# Patient Record
Sex: MALE | Race: WHITE | NOT HISPANIC OR LATINO | URBAN - METROPOLITAN AREA
[De-identification: names, ages, dates, MRNs, and addresses within clinical notes are randomized per-mention and may not be internally consistent; named-entity substitution may affect disease eponyms.]

---

## 2023-05-15 LAB
ALANINE AMINOTRANSFERASE (SGPT) (U/L) IN SER/PLAS: 19 U/L (ref 10–52)
ALBUMIN (G/DL) IN SER/PLAS: 3.8 G/DL (ref 3.4–5)
ALKALINE PHOSPHATASE (U/L) IN SER/PLAS: 72 U/L (ref 33–136)
ANION GAP IN SER/PLAS: 13 MMOL/L (ref 10–20)
ASPARTATE AMINOTRANSFERASE (SGOT) (U/L) IN SER/PLAS: 16 U/L (ref 9–39)
BASOPHILS (10*3/UL) IN BLOOD BY AUTOMATED COUNT: 0.05 X10E9/L (ref 0–0.1)
BASOPHILS/100 LEUKOCYTES IN BLOOD BY AUTOMATED COUNT: 0.7 % (ref 0–2)
BILIRUBIN TOTAL (MG/DL) IN SER/PLAS: 0.7 MG/DL (ref 0–1.2)
CALCIDIOL (25 OH VITAMIN D3) (NG/ML) IN SER/PLAS: 40 NG/ML
CALCIUM (MG/DL) IN SER/PLAS: 8.8 MG/DL (ref 8.6–10.3)
CARBON DIOXIDE, TOTAL (MMOL/L) IN SER/PLAS: 29 MMOL/L (ref 21–32)
CHLORIDE (MMOL/L) IN SER/PLAS: 103 MMOL/L (ref 98–107)
CHOLESTEROL (MG/DL) IN SER/PLAS: 165 MG/DL (ref 0–199)
CHOLESTEROL IN HDL (MG/DL) IN SER/PLAS: 36.1 MG/DL
CHOLESTEROL/HDL RATIO: 4.6
CREATININE (MG/DL) IN SER/PLAS: 0.69 MG/DL (ref 0.5–1.3)
EOSINOPHILS (10*3/UL) IN BLOOD BY AUTOMATED COUNT: 0.27 X10E9/L (ref 0–0.7)
EOSINOPHILS/100 LEUKOCYTES IN BLOOD BY AUTOMATED COUNT: 3.9 % (ref 0–6)
ERYTHROCYTE DISTRIBUTION WIDTH (RATIO) BY AUTOMATED COUNT: 13.2 % (ref 11.5–14.5)
ERYTHROCYTE MEAN CORPUSCULAR HEMOGLOBIN CONCENTRATION (G/DL) BY AUTOMATED: 33 G/DL (ref 32–36)
ERYTHROCYTE MEAN CORPUSCULAR VOLUME (FL) BY AUTOMATED COUNT: 84 FL (ref 80–100)
ERYTHROCYTES (10*6/UL) IN BLOOD BY AUTOMATED COUNT: 5.18 X10E12/L (ref 4.5–5.9)
ESTIMATED AVERAGE GLUCOSE FOR HBA1C: 103 MG/DL
GFR MALE: >90 ML/MIN/1.73M2
GLUCOSE (MG/DL) IN SER/PLAS: 99 MG/DL (ref 74–99)
HEMATOCRIT (%) IN BLOOD BY AUTOMATED COUNT: 43.3 % (ref 41–52)
HEMOGLOBIN (G/DL) IN BLOOD: 14.3 G/DL (ref 13.5–17.5)
HEMOGLOBIN A1C/HEMOGLOBIN TOTAL IN BLOOD: 5.2 %
IMMATURE GRANULOCYTES/100 LEUKOCYTES IN BLOOD BY AUTOMATED COUNT: 0.6 % (ref 0–0.9)
LDL: 99 MG/DL (ref 0–99)
LEUKOCYTES (10*3/UL) IN BLOOD BY AUTOMATED COUNT: 7 X10E9/L (ref 4.4–11.3)
LYMPHOCYTES (10*3/UL) IN BLOOD BY AUTOMATED COUNT: 1.93 X10E9/L (ref 1.2–4.8)
LYMPHOCYTES/100 LEUKOCYTES IN BLOOD BY AUTOMATED COUNT: 27.7 % (ref 13–44)
MONOCYTES (10*3/UL) IN BLOOD BY AUTOMATED COUNT: 0.53 X10E9/L (ref 0.1–1)
MONOCYTES/100 LEUKOCYTES IN BLOOD BY AUTOMATED COUNT: 7.6 % (ref 2–10)
NEUTROPHILS (10*3/UL) IN BLOOD BY AUTOMATED COUNT: 4.15 X10E9/L (ref 1.2–7.7)
NEUTROPHILS/100 LEUKOCYTES IN BLOOD BY AUTOMATED COUNT: 59.5 % (ref 40–80)
PLATELETS (10*3/UL) IN BLOOD AUTOMATED COUNT: 286 X10E9/L (ref 150–450)
POTASSIUM (MMOL/L) IN SER/PLAS: 3.6 MMOL/L (ref 3.5–5.3)
PROTEIN TOTAL: 6.3 G/DL (ref 6.4–8.2)
SODIUM (MMOL/L) IN SER/PLAS: 141 MMOL/L (ref 136–145)
THYROTROPIN (MIU/L) IN SER/PLAS BY DETECTION LIMIT <= 0.05 MIU/L: 2.41 MIU/L (ref 0.44–3.98)
TRIGLYCERIDE (MG/DL) IN SER/PLAS: 151 MG/DL (ref 0–149)
UREA NITROGEN (MG/DL) IN SER/PLAS: 26 MG/DL (ref 6–23)
VLDL: 30 MG/DL (ref 0–40)

## 2023-05-19 LAB
PROSTATE SPECIFIC AG (NG/ML) IN SER/PLAS: 5.9 NG/ML (ref 0–4)
PROSTATE SPECIFIC AG FREE (NG/ML) IN SER/PLAS: 0.8 NG/ML
PROSTATE SPECIFIC AG FREE/PROSTATE SPECIFIC AG TOTAL IN SER/PLAS: 14 %

## 2023-06-14 LAB — PROSTATE SPECIFIC AG (NG/ML) IN SER/PLAS: 4.35 NG/ML (ref 0–4)

## 2024-01-08 ENCOUNTER — LAB (OUTPATIENT)
Dept: LAB | Facility: LAB | Age: 64
End: 2024-01-08
Payer: MEDICARE

## 2024-01-08 DIAGNOSIS — I10 ESSENTIAL (PRIMARY) HYPERTENSION: Primary | ICD-10-CM

## 2024-01-08 LAB
ANION GAP SERPL CALC-SCNC: 12 MMOL/L (ref 10–20)
BUN SERPL-MCNC: 21 MG/DL (ref 6–23)
CALCIUM SERPL-MCNC: 8.8 MG/DL (ref 8.6–10.3)
CHLORIDE SERPL-SCNC: 102 MMOL/L (ref 98–107)
CO2 SERPL-SCNC: 29 MMOL/L (ref 21–32)
CREAT SERPL-MCNC: 0.73 MG/DL (ref 0.5–1.3)
EGFRCR SERPLBLD CKD-EPI 2021: >90 ML/MIN/1.73M*2
GLUCOSE SERPL-MCNC: 108 MG/DL (ref 74–99)
POTASSIUM SERPL-SCNC: 3.3 MMOL/L (ref 3.5–5.3)
SODIUM SERPL-SCNC: 140 MMOL/L (ref 136–145)

## 2024-01-08 PROCEDURE — 80048 BASIC METABOLIC PNL TOTAL CA: CPT

## 2024-01-08 PROCEDURE — 36415 COLL VENOUS BLD VENIPUNCTURE: CPT

## 2024-07-30 ENCOUNTER — APPOINTMENT (OUTPATIENT)
Dept: UROLOGY | Facility: CLINIC | Age: 64
End: 2024-07-30
Payer: MEDICARE

## 2024-07-30 VITALS — WEIGHT: 295 LBS | HEIGHT: 72 IN | TEMPERATURE: 97.1 F | BODY MASS INDEX: 39.96 KG/M2

## 2024-07-30 DIAGNOSIS — N13.8 BPH WITH OBSTRUCTION/LOWER URINARY TRACT SYMPTOMS: ICD-10-CM

## 2024-07-30 DIAGNOSIS — N40.1 BPH WITH OBSTRUCTION/LOWER URINARY TRACT SYMPTOMS: ICD-10-CM

## 2024-07-30 DIAGNOSIS — R97.20 ELEVATED PSA: Primary | ICD-10-CM

## 2024-07-30 LAB
POC APPEARANCE, URINE: CLEAR
POC BILIRUBIN, URINE: NEGATIVE
POC BLOOD, URINE: NEGATIVE
POC COLOR, URINE: YELLOW
POC GLUCOSE, URINE: NEGATIVE MG/DL
POC KETONES, URINE: NEGATIVE MG/DL
POC LEUKOCYTES, URINE: NEGATIVE
POC NITRITE,URINE: NEGATIVE
POC PH, URINE: 7 PH
POC PROTEIN, URINE: NEGATIVE MG/DL
POC SPECIFIC GRAVITY, URINE: 1.01
POC UROBILINOGEN, URINE: 0.2 EU/DL

## 2024-07-30 PROCEDURE — 99203 OFFICE O/P NEW LOW 30 MIN: CPT | Performed by: UROLOGY

## 2024-07-30 PROCEDURE — 51798 US URINE CAPACITY MEASURE: CPT | Performed by: UROLOGY

## 2024-07-30 PROCEDURE — 3008F BODY MASS INDEX DOCD: CPT | Performed by: UROLOGY

## 2024-07-30 PROCEDURE — 1036F TOBACCO NON-USER: CPT | Performed by: UROLOGY

## 2024-07-30 PROCEDURE — G2211 COMPLEX E/M VISIT ADD ON: HCPCS | Performed by: UROLOGY

## 2024-07-30 PROCEDURE — 81002 URINALYSIS NONAUTO W/O SCOPE: CPT | Performed by: UROLOGY

## 2024-07-30 RX ORDER — SILDENAFIL 50 MG/1
50 TABLET, FILM COATED ORAL DAILY PRN
COMMUNITY

## 2024-07-30 RX ORDER — AMLODIPINE BESYLATE 10 MG/1
10 TABLET ORAL
COMMUNITY
Start: 2024-05-28

## 2024-07-30 RX ORDER — LISINOPRIL 40 MG/1
40 TABLET ORAL
COMMUNITY
Start: 2024-04-15

## 2024-07-30 RX ORDER — HYDROCHLOROTHIAZIDE 50 MG/1
50 TABLET ORAL
COMMUNITY
Start: 2024-06-19

## 2024-07-30 ASSESSMENT — PAIN SCALES - GENERAL: PAINLEVEL: 0-NO PAIN

## 2024-07-30 NOTE — PROGRESS NOTES
Subjective   Tay Pereira is a 64 y.o. male with history of BPH with LUTS s/p HoLEP in 2017, patient presents today due to elevated PSA. His PSA was 4.35 on 6/13/2024. Patient has occasional urinary hesitancy and nocturia x3 which are not bothersome. Denies any recent gross hematuria, fevers, chills, urinary retention, intractable flank or abdominal pain, nausea or vomiting.            Past Medical History:   Diagnosis Date    Benign prostatic hyperplasia with lower urinary tract symptoms 10/02/2017    BPH with obstruction/lower urinary tract symptoms    Benign prostatic hyperplasia with lower urinary tract symptoms 01/08/2018    BPH associated with nocturia    Cellulitis of right lower limb 04/18/2016    Cellulitis of right lower extremity    Cellulitis of unspecified part of limb 04/18/2016    Cellulitis of ankle    Localized edema 04/18/2016    Localized edema    Personal history of other diseases of the respiratory system 04/05/2015    History of viral pharyngitis    Urge incontinence 01/08/2018    Urge incontinence     Past Surgical History:   Procedure Laterality Date    ANTERIOR CRUCIATE LIGAMENT REPAIR  05/10/2013    Primary Repair Of Knee Ligament Cruciate Anterior    OTHER SURGICAL HISTORY  08/05/2016    Treatment Of Ankle Fracture    TOTAL HIP ARTHROPLASTY  08/06/2014    Hip Replacement    TOTAL KNEE ARTHROPLASTY  08/09/2017    Total Knee Replacement Right     No family history on file.  Current Outpatient Medications   Medication Sig Dispense Refill    amLODIPine (Norvasc) 10 mg tablet Take 1 tablet (10 mg) by mouth once daily.      hydroCHLOROthiazide (HYDRODiuril) 50 mg tablet Take 1 tablet (50 mg) by mouth once daily.      lisinopril 40 mg tablet Take 1 tablet (40 mg) by mouth once daily.      sildenafil (Viagra) 50 mg tablet Take 1 tablet (50 mg) by mouth once daily as needed.       No current facility-administered medications for this visit.     No Known Allergies  Social History     Socioeconomic  History    Marital status:      Spouse name: Not on file    Number of children: Not on file    Years of education: Not on file    Highest education level: Not on file   Occupational History    Not on file   Tobacco Use    Smoking status: Never    Smokeless tobacco: Never   Substance and Sexual Activity    Alcohol use: Not on file    Drug use: Not on file    Sexual activity: Not on file   Other Topics Concern    Not on file   Social History Narrative    Not on file     Social Determinants of Health     Financial Resource Strain: Not on file   Food Insecurity: Not on file   Transportation Needs: Not on file   Physical Activity: Not on file   Stress: Not on file   Social Connections: Not on file   Intimate Partner Violence: Not on file   Housing Stability: Not on file       Review of Systems  Pertinent items are noted in HPI.    Objective       Lab Review  Lab Results   Component Value Date    WBC 7.0 05/15/2023    RBC 5.18 05/15/2023    HGB 14.3 05/15/2023    HCT 43.3 05/15/2023     05/15/2023      Lab Results   Component Value Date    BUN 21 01/08/2024    CREATININE 0.73 01/08/2024      Lab Results   Component Value Date    PSA 4.35 (H) 06/13/2023    PSA 5.9 (H) 05/15/2023     Urine analysis shows negative     Assessment/Plan   Diagnoses and all orders for this visit:  Elevated PSA  -     POCT UA (nonautomated) manually resulted  -     Measure post void residual  -     MR prostate with pan boundaries if pirads 3 or above; Future  BPH with obstruction/lower urinary tract symptoms    Elevated PSA     We will obtain a prostate MRI and follow up virtually to review.     BPH with LUTS s/p PVP in 2017.       Patient has no bothersome urinary symptoms, we will continue to monitor.     All questions were answered to the patient's satisfaction. Patient agrees with the plan and wishes to proceed. Follow-up will be scheduled appropriately.     E&M visit today is associated with current or anticipated ongoing  medical care services related to a patient's single, serious condition or a complex condition.      Scribed for Dr. Arzola by Gisele Thomas. I , Dr Arzola, have personally reviewed and agreed with the information entered by the Virtual Scribe.

## 2024-08-05 ENCOUNTER — OFFICE VISIT (OUTPATIENT)
Dept: NEUROLOGY | Facility: CLINIC | Age: 64
End: 2024-08-05
Payer: MEDICARE

## 2024-08-05 VITALS
HEART RATE: 66 BPM | HEIGHT: 73 IN | WEIGHT: 295 LBS | BODY MASS INDEX: 39.1 KG/M2 | SYSTOLIC BLOOD PRESSURE: 139 MMHG | DIASTOLIC BLOOD PRESSURE: 91 MMHG | RESPIRATION RATE: 18 BRPM

## 2024-08-05 DIAGNOSIS — G60.0 CMT (CHARCOT-MARIE-TOOTH DISEASE): Primary | ICD-10-CM

## 2024-08-05 DIAGNOSIS — G60.0 CHARCOT-MARIE-TOOTH DISEASE TYPE 1A: ICD-10-CM

## 2024-08-05 PROCEDURE — 3008F BODY MASS INDEX DOCD: CPT | Performed by: PSYCHIATRY & NEUROLOGY

## 2024-08-05 PROCEDURE — 1036F TOBACCO NON-USER: CPT | Performed by: PSYCHIATRY & NEUROLOGY

## 2024-08-05 PROCEDURE — 99214 OFFICE O/P EST MOD 30 MIN: CPT | Performed by: PSYCHIATRY & NEUROLOGY

## 2024-08-05 ASSESSMENT — PATIENT HEALTH QUESTIONNAIRE - PHQ9
SUM OF ALL RESPONSES TO PHQ9 QUESTIONS 1 AND 2: 0
1. LITTLE INTEREST OR PLEASURE IN DOING THINGS: NOT AT ALL
2. FEELING DOWN, DEPRESSED OR HOPELESS: NOT AT ALL

## 2024-08-05 ASSESSMENT — ENCOUNTER SYMPTOMS
OCCASIONAL FEELINGS OF UNSTEADINESS: 1
DEPRESSION: 0
LOSS OF SENSATION IN FEET: 1

## 2024-08-05 ASSESSMENT — PAIN SCALES - GENERAL: PAINLEVEL: 0-NO PAIN

## 2024-08-05 NOTE — PROGRESS NOTES
Neuromuscular Medicine Follow Up     Tay Pereira, MRN: [id number], : 1960  Reason for Visit: Charcot-Hiral-Tooth Disease (Follow up)     Primary Care Physician: Demetris Steen MD           Diagnosis:     Autosomal dominant Charcot-Hiral-Tooth disease type 1A      Impression/Plan:   Impression:  Tay Pereira is a 64 y.o.  male who presents for follow-up of severe CMT1A. He has been slowly progressive over the years with difficulty walking and fine manipulation involving his hands. He does not report any major medical or neurological updates since last being seen. No changes from a neurology perspective and will continue to hope for new discoveries or treatments for CMT1A    Plan:  -No change in management at this time  -Car disability placard order printed and given to patient today  -Continue daily stretching as needed and wear your left AFO when ambulating.   -Hand splints are still recommended to prevent finger contractures.    Follow-up in 1 year    Gasper Stone MD  Neuromuscular Fellow     ATTENDING NOTE - REYMUNDO FORD M.D.    I saw patient with trainee and agree with the edits, history and exam that I helped formulate per above.      Reymundo Ford M.D., F.A.C.P.   Director, Neuromuscular Center & EMG laboratory   [facility]   [facility]   Professor of Neurology   [facility] [employer]        History of Present Illness:    Mr. Pereira is a 64 y.o.  male who presents for follow-up of severe CMT1A    Interval history:  Patient notes there have been no major medical updates since last being seen 1 year ago. He does have an MRI of his prostate in about 1 month and he reports a history of BPH. Otherwise he has been staying healthy without any medical concerns.    Denies any major changes in his neurologic symptoms. He retired recently and is on disability. Is walking with a walking stick okay. Fatigue is an issue  "throughout the day. Has trouble buttoning his shirt but is able to use normal utensils and tie his shoes.    Has no other concerns today.    Relevant past medical, surgical, family, and social histories, along with ROS was reviewed and pertinent details noted above.     No Known Allergies   Medications:    Current Outpatient Medications:     amLODIPine (Norvasc) 10 mg tablet, Take 1 tablet (10 mg) by mouth once daily., Disp: , Rfl:     hydroCHLOROthiazide (HYDRODiuril) 50 mg tablet, Take 1 tablet (50 mg) by mouth once daily., Disp: , Rfl:     lisinopril 40 mg tablet, Take 1 tablet (40 mg) by mouth once daily., Disp: , Rfl:     sildenafil (Viagra) 50 mg tablet, Take 1 tablet (50 mg) by mouth once daily as needed., Disp: , Rfl:        Physical Exam:   BP (!) 139/91   Pulse 66   Resp 18   Ht 1.854 m (6' 1\")   Wt 134 kg (295 lb)   BMI 38.92 kg/m²      General Appearance:  No distress, alert, interactive and cooperative   Cranial Nerves:  CN 3, 4, 6   Lids symmetric; no ptosis  EOMs fully intact  No nystagmus   CN 7   Normal and symmetric facial strength; nasolabial folds symmetric   CN 8   Hearing intact to conversation    CN 9, 10   Palate elevates symmetrically   Phonation within normal limits, no dysarthria  CN 11   Shoulder shrug 5/5 bilaterally  CN 12   Tongue midline, with normal bulk and strength; no fasciculations     Motor:   There is atrophy at both interossei, thenar, and hypothenar eminences with clawing of bilateral hands L>R (mostly in digits 4 and 5)    Manual Muscle Testing (MMT) reveals the following MRC grades:  R L   Shoulder abduction  5- 5-  Elbow flexion   5 5  Elbow extension  5 5  Wrist flexion   4+ 4+   Wrist extension  5- 5-  Hip flexion   4 4  Knee flexion   5 5  Knee extension  5 5  Ankle dorsiflexion  X 0  Ankle plantarflexion  X 0    X=R ankle fused    Reflexes:     R          L  BR:               2          2  Biceps:         2          2  Triceps:        2          2  Knee:           " 2          2  Ankle:          2          2    Babinski: Toes are down going  Guevara's: Not present  No clonus or other pathological reflexes  No jaw jerk reflex    Sensory:   Deferred today    Gait:   Wide based gait with steppage quality and AFO on the left    Results:     No recent results to review

## 2024-08-14 ENCOUNTER — HOSPITAL ENCOUNTER (OUTPATIENT)
Dept: RADIOLOGY | Facility: HOSPITAL | Age: 64
Discharge: HOME | End: 2024-08-14
Payer: MEDICARE

## 2024-08-14 DIAGNOSIS — R97.20 ELEVATED PSA: ICD-10-CM

## 2024-08-14 PROCEDURE — 72197 MRI PELVIS W/O & W/DYE: CPT

## 2024-08-14 PROCEDURE — A9575 INJ GADOTERATE MEGLUMI 0.1ML: HCPCS | Performed by: UROLOGY

## 2024-08-14 PROCEDURE — 72197 MRI PELVIS W/O & W/DYE: CPT | Performed by: RADIOLOGY

## 2024-08-14 PROCEDURE — 2550000001 HC RX 255 CONTRASTS: Performed by: UROLOGY

## 2024-08-14 RX ORDER — GADOTERATE MEGLUMINE 376.9 MG/ML
20 INJECTION INTRAVENOUS
Status: COMPLETED | OUTPATIENT
Start: 2024-08-14 | End: 2024-08-14

## 2024-08-14 RX ORDER — GADOTERATE MEGLUMINE 376.9 MG/ML
7 INJECTION INTRAVENOUS
Status: COMPLETED | OUTPATIENT
Start: 2024-08-14 | End: 2024-08-14

## 2024-09-03 NOTE — PROGRESS NOTES
Subjective     This visit was completed via telemedicine. All issues as below were discussed and addressed but no physical exam was performed unless allowed by visual confirmation. If it was felt that the patient should be evaluated in clinic, then they were directed there. Patient verbally consented to visit.      Tay Pereira is a 64 y.o. male with history of BPH with LUTS s/p PVP in 2017 and elevated PSA, presenting today to review prostate MRI. Patient has no new complaints.  Denies any recent gross hematuria, fevers, chills, urinary retention, intractable flank or abdominal pain, nausea or vomiting.            Past Medical History:   Diagnosis Date    Benign prostatic hyperplasia with lower urinary tract symptoms 10/02/2017    BPH with obstruction/lower urinary tract symptoms    Benign prostatic hyperplasia with lower urinary tract symptoms 01/08/2018    BPH associated with nocturia    Cellulitis of right lower limb 04/18/2016    Cellulitis of right lower extremity    Cellulitis of unspecified part of limb 04/18/2016    Cellulitis of ankle    Localized edema 04/18/2016    Localized edema    Personal history of other diseases of the respiratory system 04/05/2015    History of viral pharyngitis    Urge incontinence 01/08/2018    Urge incontinence     Past Surgical History:   Procedure Laterality Date    ANTERIOR CRUCIATE LIGAMENT REPAIR  05/10/2013    Primary Repair Of Knee Ligament Cruciate Anterior    OTHER SURGICAL HISTORY  08/05/2016    Treatment Of Ankle Fracture    TOTAL HIP ARTHROPLASTY  08/06/2014    Hip Replacement    TOTAL KNEE ARTHROPLASTY  08/09/2017    Total Knee Replacement Right     Family History   Problem Relation Name Age of Onset    Hypertension Mother      Charcot-Hiral-Tooth disease Father      Heart failure Father       Current Outpatient Medications   Medication Sig Dispense Refill    amLODIPine (Norvasc) 10 mg tablet Take 1 tablet (10 mg) by mouth once daily.      hydroCHLOROthiazide  (HYDRODiuril) 50 mg tablet Take 1 tablet (50 mg) by mouth once daily.      lisinopril 40 mg tablet Take 1 tablet (40 mg) by mouth once daily.      sildenafil (Viagra) 50 mg tablet Take 1 tablet (50 mg) by mouth once daily as needed.       No current facility-administered medications for this visit.     No Known Allergies  Social History     Socioeconomic History    Marital status:      Spouse name: Not on file    Number of children: Not on file    Years of education: Not on file    Highest education level: Not on file   Occupational History    Not on file   Tobacco Use    Smoking status: Never    Smokeless tobacco: Never   Substance and Sexual Activity    Alcohol use: Not on file    Drug use: Not on file    Sexual activity: Not on file   Other Topics Concern    Not on file   Social History Narrative    Not on file     Social Determinants of Health     Financial Resource Strain: Not on file   Food Insecurity: Not on file   Transportation Needs: Not on file   Physical Activity: Not on file   Stress: Not on file   Social Connections: Not on file   Intimate Partner Violence: Not on file   Housing Stability: Not on file       Review of Systems  Pertinent items are noted in HPI.    Objective       Lab Review  Lab Results   Component Value Date    WBC 7.0 05/15/2023    RBC 5.18 05/15/2023    HGB 14.3 05/15/2023    HCT 43.3 05/15/2023     05/15/2023      Lab Results   Component Value Date    BUN 21 01/08/2024    CREATININE 0.73 01/08/2024      Lab Results   Component Value Date    PSA 4.35 (H) 06/13/2023    PSA 5.9 (H) 05/15/2023           Assessment/Plan   There are no diagnoses linked to this encounter.    History of BPH with LUTS s/p PVP in 2017  Elevated PSA    I reviewed prostate MRI from 8/14/2024 which showed 86.39g prostate with no evidence of cancer.     We will recheck PSA in 6 months and follow up virtually to review.       All questions were answered to the patient's satisfaction. Patient agrees  with the plan and wishes to proceed. Follow-up will be scheduled appropriately.     E&M visit today is associated with current or anticipated ongoing medical care services related to a patient's single, serious condition or a complex condition.    Scribed for Dr. Arzola by Gisele Thomas. I , Dr Arzola, have personally reviewed and agreed with the information entered by the Virtual Scribe.

## 2024-09-04 ENCOUNTER — APPOINTMENT (OUTPATIENT)
Dept: UROLOGY | Facility: CLINIC | Age: 64
End: 2024-09-04
Payer: MEDICARE

## 2024-09-04 DIAGNOSIS — N13.8 BPH WITH OBSTRUCTION/LOWER URINARY TRACT SYMPTOMS: ICD-10-CM

## 2024-09-04 DIAGNOSIS — R97.20 ELEVATED PSA: Primary | ICD-10-CM

## 2024-09-04 DIAGNOSIS — N40.1 BPH WITH OBSTRUCTION/LOWER URINARY TRACT SYMPTOMS: ICD-10-CM

## 2024-09-04 PROCEDURE — G2211 COMPLEX E/M VISIT ADD ON: HCPCS | Performed by: UROLOGY

## 2024-09-04 PROCEDURE — 99213 OFFICE O/P EST LOW 20 MIN: CPT | Performed by: UROLOGY

## 2025-03-11 ENCOUNTER — APPOINTMENT (OUTPATIENT)
Dept: UROLOGY | Facility: CLINIC | Age: 65
End: 2025-03-11
Payer: MEDICARE

## 2025-08-04 ENCOUNTER — APPOINTMENT (OUTPATIENT)
Dept: NEUROLOGY | Facility: CLINIC | Age: 65
End: 2025-08-04
Payer: MEDICARE

## 2025-08-04 DIAGNOSIS — G60.0 CHARCOT-MARIE-TOOTH DISEASE TYPE 1A: Primary | ICD-10-CM

## 2025-08-04 PROCEDURE — 99214 OFFICE O/P EST MOD 30 MIN: CPT | Performed by: PSYCHIATRY & NEUROLOGY

## 2025-08-04 NOTE — PROGRESS NOTES
Neuromuscular Medicine Follow Up     Tay Pereira, MRN: [id number], : 1960  Reason for Visit: Charcot-Hiral-Tooth Disease (Follow up)   Primary Care Physician: Demetris Steen MD     Diagnosis:     Charcot-Hiral-Tooth disease type 1A (CMT1A)    Impression/Plan:   Tay Pereira is a 65-year-old man presents today for follow-up visit for severe CMT1A. He has been slowly progressive over the years with difficulty walking and fine manipulation involving his hands. He does not report any major medical or neurological updates since last being seen. No changes from a neurology perspective and will continue to hope for new discoveries or treatments for CMT1A     Plan:  - No change in management at this time.  - Continue daily stretching as needed and wear your left AFO when ambulating.   - Follow-up in 1 year.     Carter Quintanilla MD  Neuromuscular Fellow    ATTENDING NOTE - REYMUNDO FORD M.D.    I saw patient with trainee and agree with the edits, history and exam that I helped formulate per above.      Reymundo Ford M.D., F.A.C.P.   Director, Neuromuscular Center & EMG laboratory   [facility]   [facility]   Professor of Neurology   [facility] [employer]    The total appointment time today was 30 minutes. Time included preparing to see the patient, obtaining the history, performing a medically necessary appropriate physical examination, counseling and educating the patient and documenting clinical information in the medical record.        History of Present Illness:    Mr. Pereira is a 65-year-old man, who presents for follow-up of severe CMT1A. Last seen on 2024. He arrives alone today, using a walking stick.      Interval history:  Patient notes there have been no major medical updates since last being seen 1 year ago. He continues to use the walking stick for long walks. No assistive devises at home apart from AFO for  the left ankle. Underwent fusion in the right ankle >15 years ago. Fatigue is an issue throughout the day. Has trouble buttoning his shirt but is able to use normal utensils and tie his shoes. Reports ongoing stiffness and pains in both elbows and mild proximal weakness in both arms and legs. Denied any falls.     RX Allergies[1]     Medications:  Current Medications[2]     Physical Exam:     General Appearance:  No distress, alert, interactive and cooperative     Focused neurological exam:   There is marked atrophy at both interossei, thenar, and hypothenar eminences with clawing of bilateral hands L>R (mostly in digits 4 and 5)     Manual Muscle Testing (MMT) reveals the following MRC grades:  R          L   Shoulder abduction                 5-        5-  Elbow flexion                           5          5  Elbow extension                      5          5  Wrist flexion                            4          4          Wrist extension                        5-        5-  Hip flexion                               4+       4+  Knee flexion                             5          5  Knee extension                        5          5  Ankle dorsiflexion                    X          0  Ankle plantarflexion                X           0     X=R ankle fused    Reflexes:     R          L  BR:               0           0  Biceps:         0           0  Triceps:        0           0  Knee:           0           0  Ankle:          0           0      Results:     No recent results to review.          [1] No Known Allergies  [2]   Current Outpatient Medications:     amLODIPine (Norvasc) 10 mg tablet, Take 1 tablet (10 mg) by mouth once daily., Disp: , Rfl:     hydroCHLOROthiazide (HYDRODiuril) 50 mg tablet, Take 1 tablet (50 mg) by mouth once daily., Disp: , Rfl:     lisinopril 40 mg tablet, Take 1 tablet (40 mg) by mouth once daily., Disp: , Rfl:     sildenafil (Viagra) 50 mg tablet, Take 1 tablet (50 mg) by mouth once daily  as needed., Disp: , Rfl:

## 2026-08-10 ENCOUNTER — APPOINTMENT (OUTPATIENT)
Dept: NEUROLOGY | Facility: CLINIC | Age: 66
End: 2026-08-10
Payer: MEDICARE